# Patient Record
Sex: FEMALE | Race: WHITE | Employment: FULL TIME | URBAN - METROPOLITAN AREA
[De-identification: names, ages, dates, MRNs, and addresses within clinical notes are randomized per-mention and may not be internally consistent; named-entity substitution may affect disease eponyms.]

---

## 2021-03-14 ENCOUNTER — HOSPITAL ENCOUNTER (EMERGENCY)
Age: 51
Discharge: HOME | End: 2021-03-14
Payer: COMMERCIAL

## 2021-03-14 ENCOUNTER — APPOINTMENT (OUTPATIENT)
Dept: GENERAL RADIOLOGY | Age: 51
End: 2021-03-14
Payer: COMMERCIAL

## 2021-03-14 VITALS
HEIGHT: 67 IN | HEART RATE: 80 BPM | SYSTOLIC BLOOD PRESSURE: 140 MMHG | RESPIRATION RATE: 18 BRPM | DIASTOLIC BLOOD PRESSURE: 74 MMHG | OXYGEN SATURATION: 97 % | BODY MASS INDEX: 29.82 KG/M2 | WEIGHT: 190 LBS | TEMPERATURE: 98.2 F

## 2021-03-14 DIAGNOSIS — S60.222A CONTUSION OF LEFT HAND, INITIAL ENCOUNTER: Primary | ICD-10-CM

## 2021-03-14 PROCEDURE — 99211 OFF/OP EST MAY X REQ PHY/QHP: CPT

## 2021-03-14 PROCEDURE — 73130 X-RAY EXAM OF HAND: CPT

## 2021-03-14 RX ORDER — NAPROXEN 500 MG/1
500 TABLET ORAL 2 TIMES DAILY
Qty: 14 TABLET | Refills: 0 | Status: SHIPPED | OUTPATIENT
Start: 2021-03-14 | End: 2021-03-21

## 2021-03-14 ASSESSMENT — PAIN DESCRIPTION - FREQUENCY
FREQUENCY: CONTINUOUS
FREQUENCY: CONTINUOUS

## 2021-03-14 ASSESSMENT — PAIN DESCRIPTION - ORIENTATION: ORIENTATION: LEFT

## 2021-03-14 ASSESSMENT — PAIN DESCRIPTION - PAIN TYPE: TYPE: ACUTE PAIN

## 2021-03-14 ASSESSMENT — PAIN DESCRIPTION - LOCATION
LOCATION: HAND
LOCATION: HAND

## 2021-03-14 ASSESSMENT — PAIN DESCRIPTION - DESCRIPTORS: DESCRIPTORS: PINS AND NEEDLES;THROBBING

## 2021-03-14 ASSESSMENT — PAIN SCALES - GENERAL: PAINLEVEL_OUTOF10: 7

## 2021-03-14 ASSESSMENT — PAIN - FUNCTIONAL ASSESSMENT: PAIN_FUNCTIONAL_ASSESSMENT: 0-10

## 2021-03-14 ASSESSMENT — PAIN DESCRIPTION - ONSET: ONSET: ON-GOING

## 2021-03-14 NOTE — ED PROVIDER NOTES
Department of Emergency Medicine  [address] [place] [address]  Provider Note  Admit Date/Time: 3/14/2021  4:54 PM  Room: 02/02  MRN: [id number]  Chief Complaint: Fall       History of Present Illness   Source of history provided by: Patient. History/Exam Limitations: None. Marvin Cabrales is a 46 y.o. female with no significant medical history. She reports a mechanical fall down 1 flight of steps just prior to arrival injuring her nondominant left hand. Denies any distal paresthesias or weakness. She did have some mild discomfort on the posterior left hip as well but is been easily weightbearing. No cervical neck or back pain. There was no other injury during the event. No head injury or loss of consciousness. Removed the ring from the left hand prior to arrival.      ROS    Pertinent positives and negatives are stated within HPI, all other systems reviewed and are negative. Past Surgical History:   Procedure Laterality Date    TONSILLECTOMY     Social History:  reports that she quit smoking about 23 years ago. Her smoking use included cigarettes. She has a 2.50 pack-year smoking history. She has never used smokeless tobacco. She reports current alcohol use. She reports that she does not use drugs. Family History: family history is not on file. Allergies: Patient has no known allergies. Physical Exam   Oxygen Saturation Interpretation: Normal.   ED Triage Vitals [03/14/21 1654]   BP Temp Temp Source Pulse Resp SpO2 Height Weight   (!) 140/74 98.2 °F (36.8 °C) Temporal 80 18 97 % 5' 7\" (1.702 m) 190 lb (86.2 kg)     Physical Exam   Physical Exam:  General: Vitals noted, no distress. Afebrile. EENT: TMs clear. Eyes unremarkable. Posterior oropharynx unremarkable. Cardiac: Regular, rate, rhythm, no murmur. Pulmonary: Lungs clear bilaterally with good aeration. No adventitious breath sounds. Abdomen: Soft, nontender, nonsurgical. No peritoneal signs. Normoactive bowel sounds. Back: Nontender throughout. Some slight tenderness in the posterior left hip region but has been easily weightbearing. Very slight tenderness near the coccyx. Extremities: Exam of the left hand she has tenderness, ecchymosis, edema about the fifth metacarpal shaft. The skin is intact. Is neurovascularly intact distally including full strength with flexion and extension. No obvious tendon injury. Is nontender over the wrist and remainder of the hand. The remainder of the extremity is nontender as well. Skin: No rash. Neuro: No gross neurologic deficits. Lab / Imaging Results   (All laboratory and radiology results have been personally reviewed by myself)  Labs:  No results found for this visit on 03/14/21. Imaging: All Radiology results interpreted by Radiologist unless otherwise noted. XR HAND LEFT (MIN 3 VIEWS)   Final Result   No acute osseous abnormality. ED Course / Medical Decision Making   Medications - No data to display       Consult(s):   None    Procedure(s):   None. Differential Diagnosis: Is extensive but includes fracture, dislocation, nonvisualized/occult fracture, tendon/ligament injury, foreign body, soft tissue injury, etc.    MDM:   This is a 46 y.o. female who presents following a mechanical fall with injury to the nondominant left hand. On exam, there is some tenderness, edema, and ecchymosis over the fifth metacarpal shaft region but is neurovascularly intact distally. No evidence of neurovascular nor tendon injury. X-rays show no obvious fracture. I offered imaging of the hip and pelvis as well as coccyx however she declined. Will be home-going with NSAIDs and orthopedic follow-up. Discussed ice as well. Counseling: I discussed the differential, results and discharge plan with the patient and/or family/friend/caregiver if present. I emphasized the importance of follow-up with the physician I referred them to in the timeframe recommended.   I explained reasons for the patient to return to the Emergency Department. Additional verbal discharge instructions were also given and discussed with the patient to supplement those generated by the EMR. We also discussed medications that were prescribed (if any) including common side effects and interactions. The patient was advised to abstain from driving, operating heavy machinery or making significant decisions while taking medications such as opiates and muscle relaxers that may impair this. All questions were addressed. They understand return precautions and discharge instructions. The patient and/or family/friend/caregiver expressed understanding. Assessment      1. Contusion of left hand, initial encounter      Plan   Discharge to home and advised to contact James Conklin [address] [place] [address]  [phone]      As needed   Patient condition is good    New Medications     New Prescriptions    NAPROXEN (NAPROSYN) 500 MG TABLET    Take 1 tablet by mouth 2 times daily for 7 days     Electronically signed by ILYA Koo   DD: 3/14/21  **This report was transcribed using voice recognition software. Every effort was made to ensure accuracy; however, inadvertent computerized transcription errors may be present.   END OF ED PROVIDER NOTE          Jennifer Lee [address], [address]  03/14/21 [address] [facility]. [address], [address]  03/14/21 [id number] [address], [address]  03/14/21 3254

## 2022-05-02 ENCOUNTER — HOSPITAL ENCOUNTER (EMERGENCY)
Age: 52
Discharge: HOME | End: 2022-05-02
Payer: COMMERCIAL

## 2022-05-02 VITALS
SYSTOLIC BLOOD PRESSURE: 170 MMHG | BODY MASS INDEX: 30.31 KG/M2 | HEART RATE: 86 BPM | WEIGHT: 200 LBS | DIASTOLIC BLOOD PRESSURE: 93 MMHG | RESPIRATION RATE: 20 BRPM | OXYGEN SATURATION: 99 % | TEMPERATURE: 97.3 F | HEIGHT: 68 IN

## 2022-05-02 DIAGNOSIS — H66.90 ACUTE OTITIS MEDIA, UNSPECIFIED OTITIS MEDIA TYPE: Primary | ICD-10-CM

## 2022-05-02 PROCEDURE — 99211 OFF/OP EST MAY X REQ PHY/QHP: CPT

## 2022-05-02 RX ORDER — AMOXICILLIN AND CLAVULANATE POTASSIUM 875; 125 MG/1; MG/1
1 TABLET, FILM COATED ORAL 2 TIMES DAILY
Qty: 20 TABLET | Refills: 0 | Status: SHIPPED | OUTPATIENT
Start: 2022-05-02 | End: 2022-05-12

## 2022-05-02 RX ORDER — HYDROCODONE BITARTRATE AND ACETAMINOPHEN 5; 325 MG/1; MG/1
1 TABLET ORAL EVERY 6 HOURS PRN
Qty: 10 TABLET | Refills: 0 | Status: SHIPPED | OUTPATIENT
Start: 2022-05-02 | End: 2022-05-05

## 2022-05-02 ASSESSMENT — PAIN DESCRIPTION - PAIN TYPE: TYPE: ACUTE PAIN

## 2022-05-02 ASSESSMENT — PAIN DESCRIPTION - ORIENTATION: ORIENTATION: RIGHT;LEFT

## 2022-05-02 ASSESSMENT — PAIN SCALES - GENERAL: PAINLEVEL_OUTOF10: 9

## 2022-05-02 ASSESSMENT — PAIN DESCRIPTION - LOCATION: LOCATION: EAR

## 2022-05-02 ASSESSMENT — PAIN - FUNCTIONAL ASSESSMENT: PAIN_FUNCTIONAL_ASSESSMENT: 0-10

## 2022-05-02 ASSESSMENT — PAIN DESCRIPTION - DESCRIPTORS: DESCRIPTORS: ACHING;DISCOMFORT;SORE

## 2022-05-02 NOTE — ED PROVIDER NOTES
Department of Emergency Medicine   [facility]  Provider Note  Admit Date/RoomTime: 2022  6:00 PM  Room:   NAME: Darius Little  : 1970  MRN: [id number]     Chief Complaint:  Otalgia (started this am with  belén ear pain)    History of Present Illness       Darius Little is a 45 y.o. old female with a past medical history of: History reviewed. No pertinent past medical history. ,who presents to urgent care center with complaint of ear pain especially the right she said it kept her awake during the night she said she did not sleep very well and today it got worse and also in the house 2 hours ago the left ear started hurting she said her sinuses run all the time but she is not complaining of pain or pressure over her sinuses. She does not have a fever not complaining of chest pain cough shortness of breath or fever. ROS    Pertinent positives and negatives are stated within HPI, all other systems reviewed and are negative. Past Surgical History:  has a past surgical history that includes Tonsillectomy. Social History:  reports that she quit smoking about 24 years ago. Her smoking use included cigarettes. She has a 2.50 pack-year smoking history. She has never used smokeless tobacco. She reports current alcohol use. She reports that she does not use drugs. Family History: family history is not on file. Allergies: Patient has no known allergies. Physical Exam   Oxygen Saturation Interpretation: Normal.        ED Triage Vitals   BP Temp Temp Source Pulse Resp SpO2 Height Weight   22 1807 22 1801 22 1801 22 18022 18022 1801 22 1801 22 1801   (!) 170/93 97.3 °F (36.3 °C) Infrared 86 20 99 % 5' 8\" (1.727 m) 200 lb (90.7 kg)         Constitutional:  Alert, development consistent with age.   Ears:  External Ears: Bilateral normal.                 TM's & External Canals:  abnormal TM right ear - erythematous, bulging, abnormal TM left ear - erythematous. Nose:   There is no abnormalities present  Throat:  Pharynx without injection, exudate, or tonsillar hypertrophy. Airway patient. Neck:  Normal ROM. Supple. No lymph node enlargement  Respiratory:  No respiratory distress    CV: Regular rate   Skin:  No rashes, erythema present,   Lymphatic: No lymphangitis or adenopathy noted. Neurological:  Oriented. Motor functions intact. Lab / Imaging Results   (All laboratory and radiology results have been personally reviewed by myself)  Labs:  No results found for this visit on 05/02/22. Imaging: All Radiology results interpreted by Radiologist unless otherwise noted. No orders to display     ED Course / Medical Decision Making   Medications - No data to display     MDM:   She said ibuprofen and Tylenol are  not touching the pain in her ear she said it severe she wants something for pain I did order her a few Norco.  I did put her on Augmentin. I advised her to follow-up with her doctor if anything worsens--- she should  Also get reevaluated for her blood pressure. It  is elevated today she was advised to see her doctor for recheck. Assessment      1. Acute otitis media, unspecified otitis media type      Plan   Discharge to home and advised to contact call the referral line to get established with a   [phone]  Schedule an appointment as soon as possible for a visit      Patient condition is good    New Medications     New Prescriptions    AMOXICILLIN-CLAVULANATE (AUGMENTIN) 875-125 MG PER TABLET    Take 1 tablet by mouth 2 times daily for 10 days    HYDROCODONE-ACETAMINOPHEN (NORCO) 5-325 MG PER TABLET    Take 1 tablet by mouth every 6 hours as needed for Pain for up to 3 days. Electronically signed by JOHN Auguste CNP   DD: 5/2/22  **This report was transcribed using voice recognition software.  Every effort was made to ensure accuracy; however, inadvertent computerized transcription errors may be present.   END OF ED PROVIDER NOTE     JOHN Byrd - CNP  05/02/22 4029

## 2023-09-09 ENCOUNTER — HOSPITAL ENCOUNTER (EMERGENCY)
Age: 53
Discharge: HOME | End: 2023-09-09
Payer: COMMERCIAL

## 2023-09-09 VITALS
SYSTOLIC BLOOD PRESSURE: 166 MMHG | HEART RATE: 77 BPM | TEMPERATURE: 98.6 F | OXYGEN SATURATION: 99 % | BODY MASS INDEX: 33.74 KG/M2 | WEIGHT: 215 LBS | DIASTOLIC BLOOD PRESSURE: 90 MMHG | RESPIRATION RATE: 18 BRPM | HEIGHT: 67 IN

## 2023-09-09 DIAGNOSIS — J06.9 UPPER RESPIRATORY TRACT INFECTION, UNSPECIFIED TYPE: Primary | ICD-10-CM

## 2023-09-09 DIAGNOSIS — J04.0 LARYNGITIS: ICD-10-CM

## 2023-09-09 PROCEDURE — 99211 OFF/OP EST MAY X REQ PHY/QHP: CPT

## 2023-09-09 RX ORDER — AMOXICILLIN 500 MG/1
500 CAPSULE ORAL 3 TIMES DAILY
Qty: 30 CAPSULE | Refills: 0 | Status: SHIPPED | OUTPATIENT
Start: 2023-09-09 | End: 2023-09-19

## 2023-09-09 ASSESSMENT — PAIN SCALES - GENERAL: PAINLEVEL_OUTOF10: 0

## 2023-09-09 ASSESSMENT — PAIN - FUNCTIONAL ASSESSMENT: PAIN_FUNCTIONAL_ASSESSMENT: 0-10

## 2023-11-29 DIAGNOSIS — G47.33 OBSTRUCTIVE SLEEP APNEA: Primary | ICD-10-CM

## 2023-12-03 ENCOUNTER — HOSPITAL ENCOUNTER (EMERGENCY)
Age: 53
Discharge: HOME | End: 2023-12-03
Payer: COMMERCIAL

## 2023-12-03 VITALS
OXYGEN SATURATION: 98 % | DIASTOLIC BLOOD PRESSURE: 83 MMHG | RESPIRATION RATE: 18 BRPM | TEMPERATURE: 98 F | SYSTOLIC BLOOD PRESSURE: 159 MMHG | HEART RATE: 72 BPM

## 2023-12-03 DIAGNOSIS — J01.90 ACUTE VIRAL SINUSITIS: Primary | ICD-10-CM

## 2023-12-03 DIAGNOSIS — J06.9 VIRAL URI WITH COUGH: ICD-10-CM

## 2023-12-03 DIAGNOSIS — B97.89 ACUTE VIRAL SINUSITIS: Primary | ICD-10-CM

## 2023-12-03 PROCEDURE — 99211 OFF/OP EST MAY X REQ PHY/QHP: CPT

## 2023-12-03 RX ORDER — BROMPHENIRAMINE MALEATE, PSEUDOEPHEDRINE HYDROCHLORIDE, AND DEXTROMETHORPHAN HYDROBROMIDE 2; 30; 10 MG/5ML; MG/5ML; MG/5ML
5 SYRUP ORAL 4 TIMES DAILY PRN
Qty: 140 ML | Refills: 0 | Status: SHIPPED | OUTPATIENT
Start: 2023-12-03 | End: 2023-12-10

## 2023-12-03 NOTE — DISCHARGE INSTRUCTIONS
Your symptoms are likely viral such as the common cold. Continue Astelin nasal spray and fluticasone nasal spray. Bromfed-DM 4 times daily as needed for cough and congestion. Do not take any additional Sudafed with this medication. You may use ibuprofen. Use saline sinus rinses as listed below. Follow-up with PCP for any new or concerning symptoms.

## 2024-01-02 ENCOUNTER — CLINICAL SUPPORT (OUTPATIENT)
Dept: SLEEP MEDICINE | Facility: HOSPITAL | Age: 54
End: 2024-01-02
Payer: COMMERCIAL

## 2024-01-02 DIAGNOSIS — G47.33 OBSTRUCTIVE SLEEP APNEA: Primary | ICD-10-CM

## 2024-01-02 PROCEDURE — 95806 SLEEP STUDY UNATT&RESP EFFT: CPT | Performed by: PSYCHIATRY & NEUROLOGY
